# Patient Record
Sex: FEMALE | Race: WHITE
[De-identification: names, ages, dates, MRNs, and addresses within clinical notes are randomized per-mention and may not be internally consistent; named-entity substitution may affect disease eponyms.]

---

## 2017-08-22 ENCOUNTER — HOSPITAL ENCOUNTER (OUTPATIENT)
Dept: HOSPITAL 18 - NAV RAD | Age: 57
Discharge: HOME | End: 2017-08-22
Payer: COMMERCIAL

## 2017-08-22 DIAGNOSIS — R07.81: Primary | ICD-10-CM

## 2017-08-22 NOTE — RAD
RIGHT RIBS TWO VIEW

8/22/17

 

HISTORY: 

Rib pain.

 

COMPARISON:  

None.

 

FINDINGS:  

No displaced fracture of the ribs. No significant effusion. 

 

The cardiac silhouette is unremarkable. 

 

IMPRESSION:  

No displaced rib fracture.

 

POS: [facility]

## 2018-10-23 ENCOUNTER — HOSPITAL ENCOUNTER (OUTPATIENT)
Dept: HOSPITAL 18 - NAV RAD | Age: 58
Discharge: HOME | End: 2018-10-23
Payer: COMMERCIAL

## 2018-10-23 DIAGNOSIS — R91.8: ICD-10-CM

## 2018-10-23 DIAGNOSIS — I70.0: ICD-10-CM

## 2018-10-23 DIAGNOSIS — J44.1: Primary | ICD-10-CM

## 2018-10-23 PROCEDURE — 71046 X-RAY EXAM CHEST 2 VIEWS: CPT

## 2018-10-23 NOTE — RAD
CHEST PA AND LATERAL:

10/23/18

 

HISTORY: 

57-year-old female with history of COPD. 

 

Heart size is within normal limits. There is some bilateral hyperinflation and chronic lung changes. 
No confluent pneumonia, overt edema, or pleural effusions. 

 

IMPRESSION:  

Hyperinflation and chronic lung changes. Atherosclerosis of the aorta. No significant acute intrathor
acic disease. 

 

POS: [facility]